# Patient Record
Sex: FEMALE | Race: WHITE | NOT HISPANIC OR LATINO | URBAN - NONMETROPOLITAN AREA
[De-identification: names, ages, dates, MRNs, and addresses within clinical notes are randomized per-mention and may not be internally consistent; named-entity substitution may affect disease eponyms.]

---

## 2018-04-03 NOTE — PATIENT DISCUSSION
Pinguecula Counseling:  I have explained to the patient at length the diagnosis of pinguecula and its pathophysiology. I recommended the patient adequately protect their eyes from excessive UV light and dry, cody conditions. The use of artificial tears in dry conditions was encouraged. Return for follow-up as scheduled.

## 2021-04-13 ENCOUNTER — IMPORTED ENCOUNTER (OUTPATIENT)
Dept: URBAN - NONMETROPOLITAN AREA CLINIC 1 | Facility: CLINIC | Age: 61
End: 2021-04-13

## 2021-04-13 PROCEDURE — 92015 DETERMINE REFRACTIVE STATE: CPT

## 2021-04-13 PROCEDURE — 92004 COMPRE OPH EXAM NEW PT 1/>: CPT

## 2022-04-09 ASSESSMENT — VISUAL ACUITY
OU_CC: 20/60-1
OD_CC: 20/25 W/ READERS
OD_CC: 20/60-1
OS_CC: 20/25 W/ READERS
OS_CC: 20/60-2

## 2022-04-09 ASSESSMENT — TONOMETRY
OS_IOP_MMHG: 15
OD_IOP_MMHG: 15

## 2022-11-28 ENCOUNTER — ESTABLISHED PATIENT (OUTPATIENT)
Dept: RURAL CLINIC 1 | Facility: CLINIC | Age: 62
End: 2022-11-28

## 2022-11-28 DIAGNOSIS — H25.13: ICD-10-CM

## 2022-11-28 DIAGNOSIS — H52.4: ICD-10-CM

## 2022-11-28 DIAGNOSIS — H52.03: ICD-10-CM

## 2022-11-28 PROCEDURE — 92014 COMPRE OPH EXAM EST PT 1/>: CPT

## 2022-11-28 PROCEDURE — 92015 DETERMINE REFRACTIVE STATE: CPT

## 2022-11-28 ASSESSMENT — VISUAL ACUITY
OD_CC: 20/20
OU_CC: 20/20
OS_CC: 20/25

## 2022-11-28 ASSESSMENT — TONOMETRY
OS_IOP_MMHG: 16
OD_IOP_MMHG: 16

## 2024-07-08 ENCOUNTER — ESTABLISHED PATIENT (OUTPATIENT)
Dept: RURAL CLINIC 1 | Facility: CLINIC | Age: 64
End: 2024-07-08

## 2024-07-08 DIAGNOSIS — H52.03: ICD-10-CM

## 2024-07-08 DIAGNOSIS — H25.13: ICD-10-CM

## 2024-07-08 DIAGNOSIS — H52.4: ICD-10-CM

## 2024-07-08 PROCEDURE — 92015 DETERMINE REFRACTIVE STATE: CPT

## 2024-07-08 PROCEDURE — 92014 COMPRE OPH EXAM EST PT 1/>: CPT

## 2024-07-08 ASSESSMENT — VISUAL ACUITY
OS_PH: 20/30-1
OS_CC: 20/20
OD_CC: 20/20
OS_SC: 20/50-1
OU_CC: 20/20
OD_PH: 20/40
OD_SC: 20/50-1
OU_SC: 20/40-1

## 2024-07-08 ASSESSMENT — TONOMETRY
OS_IOP_MMHG: 9
OD_IOP_MMHG: 9

## 2025-07-14 ENCOUNTER — COMPREHENSIVE EXAM (OUTPATIENT)
Age: 65
End: 2025-07-14

## 2025-07-14 DIAGNOSIS — H25.13: ICD-10-CM

## 2025-07-14 PROCEDURE — 92014 COMPRE OPH EXAM EST PT 1/>: CPT
